# Patient Record
Sex: FEMALE | Race: WHITE | Employment: FULL TIME | URBAN - METROPOLITAN AREA
[De-identification: names, ages, dates, MRNs, and addresses within clinical notes are randomized per-mention and may not be internally consistent; named-entity substitution may affect disease eponyms.]

---

## 2019-08-30 ENCOUNTER — OFFICE VISIT (OUTPATIENT)
Dept: OBGYN | Facility: CLINIC | Age: 24
End: 2019-08-30
Attending: ADVANCED PRACTICE MIDWIFE
Payer: COMMERCIAL

## 2019-08-30 VITALS
BODY MASS INDEX: 22.33 KG/M2 | WEIGHT: 150.8 LBS | HEART RATE: 80 BPM | SYSTOLIC BLOOD PRESSURE: 113 MMHG | DIASTOLIC BLOOD PRESSURE: 73 MMHG | HEIGHT: 69 IN

## 2019-08-30 DIAGNOSIS — Z12.4 SCREENING FOR MALIGNANT NEOPLASM OF CERVIX: ICD-10-CM

## 2019-08-30 DIAGNOSIS — Z00.00 VISIT FOR PREVENTIVE HEALTH EXAMINATION: Primary | ICD-10-CM

## 2019-08-30 DIAGNOSIS — Z30.430 ENCOUNTER FOR IUD INSERTION: ICD-10-CM

## 2019-08-30 DIAGNOSIS — N89.8 VAGINAL ITCHING: ICD-10-CM

## 2019-08-30 DIAGNOSIS — Z01.419 ENCOUNTER FOR GYNECOLOGICAL EXAMINATION WITHOUT ABNORMAL FINDING: ICD-10-CM

## 2019-08-30 LAB
CLUE CELLS: NEGATIVE
TRICHOMONAS (WET PREP): NEGATIVE
YEAST (WET PREP): NEGATIVE

## 2019-08-30 PROCEDURE — 87591 N.GONORRHOEAE DNA AMP PROB: CPT | Performed by: ADVANCED PRACTICE MIDWIFE

## 2019-08-30 PROCEDURE — G0145 SCR C/V CYTO,THINLAYER,RESCR: HCPCS | Performed by: ADVANCED PRACTICE MIDWIFE

## 2019-08-30 PROCEDURE — 87210 SMEAR WET MOUNT SALINE/INK: CPT | Mod: ZF | Performed by: ADVANCED PRACTICE MIDWIFE

## 2019-08-30 PROCEDURE — 87491 CHLMYD TRACH DNA AMP PROBE: CPT | Performed by: ADVANCED PRACTICE MIDWIFE

## 2019-08-30 PROCEDURE — G0463 HOSPITAL OUTPT CLINIC VISIT: HCPCS | Mod: ZF

## 2019-08-30 SDOH — HEALTH STABILITY: MENTAL HEALTH: HOW OFTEN DO YOU HAVE A DRINK CONTAINING ALCOHOL?: NEVER

## 2019-08-30 ASSESSMENT — ENCOUNTER SYMPTOMS
POLYDIPSIA: 0
NUMBNESS: 0
SHORTNESS OF BREATH: 0
SKIN CHANGES: 0
CONSTIPATION: 0
SEIZURES: 0
BRUISES/BLEEDS EASILY: 0
SORE THROAT: 0
EXTREMITY NUMBNESS: 0
POSTURAL DYSPNEA: 0
MEMORY LOSS: 0
PANIC: 0
DECREASED APPETITE: 0
EYE REDNESS: 0
HALLUCINATIONS: 0
COUGH: 0
SLEEP DISTURBANCES DUE TO BREATHING: 0
EYE PAIN: 0
DISTURBANCES IN COORDINATION: 0
CLAUDICATION: 0
RESPIRATORY PAIN: 0
FLANK PAIN: 0
DIARRHEA: 0
ARTHRALGIAS: 0
SINUS PAIN: 0
EYE IRRITATION: 0
HEMATURIA: 0
POOR WOUND HEALING: 0
TREMORS: 0
SINUS CONGESTION: 0
LEG PAIN: 0
ABDOMINAL PAIN: 0
MUSCLE WEAKNESS: 0
INSOMNIA: 0
NAIL CHANGES: 0
PALPITATIONS: 0
TACHYCARDIA: 0
DIZZINESS: 0
HYPOTENSION: 0
NERVOUS/ANXIOUS: 0
ORTHOPNEA: 0
TROUBLE SWALLOWING: 0
WEAKNESS: 0
NIGHT SWEATS: 0
FATIGUE: 0
VOMITING: 0
WEIGHT LOSS: 0
HOT FLASHES: 0
FEVER: 0
BREAST PAIN: 0
HOARSE VOICE: 0
LIGHT-HEADEDNESS: 0
POLYPHAGIA: 0
RECTAL BLEEDING: 0
TASTE DISTURBANCE: 0
NAUSEA: 0
MYALGIAS: 0
DIFFICULTY URINATING: 0
WEIGHT GAIN: 0
SPUTUM PRODUCTION: 0
BOWEL INCONTINENCE: 0
BLOATING: 0
JAUNDICE: 0
LEG SWELLING: 0
HEARTBURN: 0
ALTERED TEMPERATURE REGULATION: 0
DYSURIA: 0
HYPERTENSION: 0
HEMOPTYSIS: 0
EYE WATERING: 0
TINGLING: 0
BACK PAIN: 0
BREAST MASS: 0
LOSS OF CONSCIOUSNESS: 0
CHILLS: 0
JOINT SWELLING: 0
DEPRESSION: 0
HEADACHES: 0
SNORES LOUDLY: 0
NECK PAIN: 0
MUSCLE CRAMPS: 0
INCREASED ENERGY: 0
SPEECH CHANGE: 0
DECREASED LIBIDO: 0
RECTAL PAIN: 0
PARALYSIS: 0
BLOOD IN STOOL: 0
SYNCOPE: 0
SWOLLEN GLANDS: 0
STIFFNESS: 0
SMELL DISTURBANCE: 0
NECK MASS: 0
COUGH DISTURBING SLEEP: 0
EXERCISE INTOLERANCE: 0
DYSPNEA ON EXERTION: 0
WHEEZING: 0
DOUBLE VISION: 0
DECREASED CONCENTRATION: 0

## 2019-08-30 ASSESSMENT — ANXIETY QUESTIONNAIRES
2. NOT BEING ABLE TO STOP OR CONTROL WORRYING: NOT AT ALL
GAD7 TOTAL SCORE: 0
1. FEELING NERVOUS, ANXIOUS, OR ON EDGE: NOT AT ALL
6. BECOMING EASILY ANNOYED OR IRRITABLE: NOT AT ALL
5. BEING SO RESTLESS THAT IT IS HARD TO SIT STILL: NOT AT ALL
7. FEELING AFRAID AS IF SOMETHING AWFUL MIGHT HAPPEN: NOT AT ALL
3. WORRYING TOO MUCH ABOUT DIFFERENT THINGS: NOT AT ALL

## 2019-08-30 ASSESSMENT — PATIENT HEALTH QUESTIONNAIRE - PHQ9: 5. POOR APPETITE OR OVEREATING: NOT AT ALL

## 2019-08-30 ASSESSMENT — MIFFLIN-ST. JEOR: SCORE: 1508.4

## 2019-08-30 NOTE — PROGRESS NOTES
Progress Note    SUBJECTIVE:  Bran Bain is an 24 year old, , who requests an Annual Preventive Exam.     Concerns today include: Complains of very mild itching on her perineum. Denies any abnormal vaginal discharge. No increase in discharge. No odor. No pain with IC. No burning with urination.     Menstrual History:  No flowsheet data found.    First pap today.     Mammogram current: not applicable  Last Mammogram:   No results found.     Last Colonoscopy:  No results found for this or any previous visit.      HISTORY:    Pt eats a vegan diet.   Taking Vit d with K, B12,B complex,  Iron, Lysine      No Known Allergies    There is no immunization history on file for this patient.    OB History    Para Term  AB Living   0 0 0 0 0 0   SAB TAB Ectopic Multiple Live Births   0 0 0 0 0     Past Medical History:   Diagnosis Date     H/O cold sores     oral only     Past Surgical History:   Procedure Laterality Date     NO HISTORY OF SURGERY       tooth extraction       History reviewed. No pertinent family history.  Social History     Socioeconomic History     Marital status: Single     Spouse name: Not on file     Number of children: Not on file     Years of education: Not on file     Highest education level: Not on file   Occupational History     Not on file   Social Needs     Financial resource strain: Not on file     Food insecurity:     Worry: Not on file     Inability: Not on file     Transportation needs:     Medical: Not on file     Non-medical: Not on file   Tobacco Use     Smoking status: Not on file   Substance and Sexual Activity     Alcohol use: Not on file     Drug use: Not on file     Sexual activity: Not on file   Lifestyle     Physical activity:     Days per week: Not on file     Minutes per session: Not on file     Stress: Not on file   Relationships     Social connections:     Talks on phone: Not on file     Gets together: Not on file     Attends Christian service: Not  on file     Active member of club or organization: Not on file     Attends meetings of clubs or organizations: Not on file     Relationship status: Not on file     Intimate partner violence:     Fear of current or ex partner: Not on file     Emotionally abused: Not on file     Physically abused: Not on file     Forced sexual activity: Not on file   Other Topics Concern     Not on file   Social History Narrative     Not on file       Review of Systems     Constitutional:  Negative for fever, chills, weight loss, weight gain, fatigue, decreased appetite, night sweats, recent stressors, height gain, height loss, post-operative complications, incisional pain, hallucinations, increased energy, hyperactivity and confused.   HENT:  Negative for ear pain, hearing loss, tinnitus, nosebleeds, trouble swallowing, hoarse voice, mouth sores, sore throat, ear discharge, tooth pain, gum tenderness, taste disturbance, smell disturbance, hearing aid, bleeding gums, dry mouth, sinus pain, sinus congestion and neck mass.    Eyes:  Negative for double vision, pain, redness, eye pain, decreased vision, eye watering, eye bulging, eye dryness, flashing lights, spots, floaters, strabismus, tunnel vision, jaundice and eye irritation.   Respiratory:   Negative for cough, hemoptysis, sputum production, shortness of breath, wheezing, sleep disturbances due to breathing, snores loudly, respiratory pain, dyspnea on exertion, cough disturbing sleep and postural dyspnea.    Cardiovascular:  Negative for chest pain, dyspnea on exertion, palpitations, orthopnea, claudication, leg swelling, fingers/toes turn blue, hypertension, hypotension, syncope, history of heart murmur, chest pain on exertion, chest pain at rest, pacemaker, few scattered varicosities, leg pain, sleep disturbances due to breathing, tachycardia, light-headedness, exercise intolerance and edema.   Gastrointestinal:  Negative for heartburn, nausea, vomiting, abdominal pain, diarrhea,  "constipation, blood in stool, melena, rectal pain, bloating, hemorrhoids, bowel incontinence, jaundice, rectal bleeding, coffee ground emesis and change in stool.   Genitourinary:  Negative for bladder incontinence, dysuria, urgency, hematuria, flank pain, vaginal discharge, difficulty urinating, genital sores, dyspareunia, decreased libido, nocturia, voiding less frequently, arousal difficulty, abnormal vaginal bleeding, excessive menstruation, menstrual changes, hot flashes, vaginal dryness and postmenopausal bleeding.   Musculoskeletal:  Negative for myalgias, back pain, joint swelling, arthralgias, stiffness, muscle cramps, neck pain, bone pain, muscle weakness and fracture.   Skin:  Negative for nail changes, itching, poor wound healing, rash, hair changes, skin changes, acne, warts, poor wound healing, scarring, flaky skin, Raynaud's phenomenon, sensitivity to sunlight and skin thickening.   Neurological:  Negative for dizziness, tingling, tremors, speech change, seizures, loss of consciousness, weakness, light-headedness, numbness, headaches, disturbances in coordination, extremity numbness, memory loss, difficulty walking and paralysis.   Endo/Heme:  Negative for anemia, swollen glands and bruises/bleeds easily.   Psychiatric/Behavioral:  Negative for depression, hallucinations, memory loss, decreased concentration, mood swings and panic attacks.    Breast:  Negative for breast discharge, breast mass, breast pain and nipple retraction.   Endocrine:  Negative for altered temperature regulation, polyphagia, polydipsia, unwanted hair growth and change in facial hair.    [unfilled]  No flowsheet data found.  GLENDY-7 SCORE 8/30/2019   Total Score 0         EXAM:  Blood pressure 113/73, pulse 80, height 1.753 m (5' 9\"), weight 68.4 kg (150 lb 12.8 oz), not currently breastfeeding. Body mass index is 22.27 kg/m .  General - pleasant female in no acute distress.  Skin - no suspicious lesions or rashes  ROSARIO PRETTY, " euthyroid with out palpable nodules  Neck - supple without lymphadenopathy.  Lungs - clear to auscultation bilaterally.  Heart - regular rate and rhythm without murmur.  Abdomen - soft, nontender, nondistended, no masses or organomegaly noted.  Musculoskeletal - no gross deformities.  Neurological - normal strength, sensation, and mental status.    Breast Exam:  Breast: Without visible skin changes. No dimpling or lesions seen.   Breasts supple, non-tender with palpation, no dominant mass, nodularity, or nipple discharge noted bilaterally. Axillary nodes negative.      Pelvic Exam:  EG/BUS: Normal genital architecture without lesions, erythema or abnormal secretions Bartholin's, Urethra, [place]'s normal   Urethral meatus: normal   Urethra: no masses, tenderness, or scarring   Bladder: no masses or tenderness   Vagina: moist, pink, rugae with creamy, white and odorless  secretions  Cervix: Nulliparous, and pink, moist, closed, without lesion or CMT, IUD strings visible  Uterus: small, smooth, firm, mobile w/o pain  Adnexa: Within normal limits and No masses, nodularity, tenderness  Rectum:anus normal     Microscopic wet-mount exam shows No yeast, No clue cells and No trichomonads. Normal pH. Negative whiff.       ASSESSMENT:  Encounter Diagnoses   Name Primary?     Visit for preventive health examination Yes     Screening for malignant neoplasm of cervix      Encounter for gynecological examination without abnormal finding      Encounter for IUD insertion         PLAN:   Orders Placed This Encounter   Procedures     Pelvic and Breast Exam Procedure []     Pap Smear Exam [] Do Not Remove     Pap imaged thin layer screen only - recommended age 21 - 24 years     25- [place]-Vitamin D     Vitamin B12     Additional teaching done at this visit regarding exercise and birth control.  Vaginal hygiene reviewed.    Return to clinic in one year.  Follow-up as needed.

## 2019-08-30 NOTE — PATIENT INSTRUCTIONS

## 2019-08-30 NOTE — LETTER
2019       RE: Bran Bain  [address]  [facility] [address]     Dear Colleague,    Thank you for referring your patient, Bran Bain, to the [facility] at [facility]. Please see a copy of my visit note below.        Progress Note    SUBJECTIVE:  Bran Bain is an 24 year old, , who requests an Annual Preventive Exam.     Concerns today include: Complains of very mild itching on her perineum. Denies any abnormal vaginal discharge. No increase in discharge. No odor. No pain with IC. No burning with urination.     Menstrual History:  No flowsheet data found.    First pap today.     Mammogram current: not applicable  Last Mammogram:   No results found.     Last Colonoscopy:  No results found for this or any previous visit.    HISTORY:  Pt eats a vegan diet.   Taking Vit d with K, B12,B complex,  Iron, Lysine      No Known Allergies    There is no immunization history on file for this patient.    OB History    Para Term  AB Living   0 0 0 0 0 0   SAB TAB Ectopic Multiple Live Births   0 0 0 0 0     Past Medical History:   Diagnosis Date     H/O cold sores     oral only     Past Surgical History:   Procedure Laterality Date     NO HISTORY OF SURGERY       tooth extraction  2017     History reviewed. No pertinent family history.  Social History     Socioeconomic History     Marital status: Single     Spouse name: Not on file     Number of children: Not on file     Years of education: Not on file     Highest education level: Not on file   Occupational History     Not on file   Social Needs     Financial resource strain: Not on file     Food insecurity:     Worry: Not on file     Inability: Not on file     Transportation needs:     Medical: Not on file     Non-medical: Not on file   Tobacco Use     Smoking status: Not on file   Substance and Sexual Activity     Alcohol use: Not on file     Drug use: Not on  file     Sexual activity: Not on file   Lifestyle     Physical activity:     Days per week: Not on file     Minutes per session: Not on file     Stress: Not on file   Relationships     Social connections:     Talks on phone: Not on file     Gets together: Not on file     Attends [employer] service: Not on file     Active member of club or organization: Not on file     Attends meetings of clubs or organizations: Not on file     Relationship status: Not on file     Intimate partner violence:     Fear of current or ex partner: Not on file     Emotionally abused: Not on file     Physically abused: Not on file     Forced sexual activity: Not on file   Other Topics Concern     Not on file   Social History Narrative     Not on file     Review of Systems     Constitutional:  Negative for fever, chills, weight loss, weight gain, fatigue, decreased appetite, night sweats, recent stressors, height gain, height loss, post-operative complications, incisional pain, hallucinations, increased energy, hyperactivity and confused.   HENT:  Negative for ear pain, hearing loss, tinnitus, nosebleeds, trouble swallowing, hoarse voice, mouth sores, sore throat, ear discharge, tooth pain, gum tenderness, taste disturbance, smell disturbance, hearing aid, bleeding gums, dry mouth, sinus pain, sinus congestion and neck mass.    Eyes:  Negative for double vision, pain, redness, eye pain, decreased vision, eye watering, eye bulging, eye dryness, flashing lights, spots, floaters, strabismus, tunnel vision, jaundice and eye irritation.   Respiratory:   Negative for cough, hemoptysis, sputum production, shortness of breath, wheezing, sleep disturbances due to breathing, snores loudly, respiratory pain, dyspnea on exertion, cough disturbing sleep and postural dyspnea.    Cardiovascular:  Negative for chest pain, dyspnea on exertion, palpitations, orthopnea, claudication, leg swelling, fingers/toes turn blue, hypertension, hypotension, syncope,  history of heart murmur, chest pain on exertion, chest pain at rest, pacemaker, few scattered varicosities, leg pain, sleep disturbances due to breathing, tachycardia, light-headedness, exercise intolerance and edema.   Gastrointestinal:  Negative for heartburn, nausea, vomiting, abdominal pain, diarrhea, constipation, blood in stool, melena, rectal pain, bloating, hemorrhoids, bowel incontinence, jaundice, rectal bleeding, coffee ground emesis and change in stool.   Genitourinary:  Negative for bladder incontinence, dysuria, urgency, hematuria, flank pain, vaginal discharge, difficulty urinating, genital sores, dyspareunia, decreased libido, nocturia, voiding less frequently, arousal difficulty, abnormal vaginal bleeding, excessive menstruation, menstrual changes, hot flashes, vaginal dryness and postmenopausal bleeding.   Musculoskeletal:  Negative for myalgias, back pain, joint swelling, arthralgias, stiffness, muscle cramps, neck pain, bone pain, muscle weakness and fracture.   Skin:  Negative for nail changes, itching, poor wound healing, rash, hair changes, skin changes, acne, warts, poor wound healing, scarring, flaky skin, Raynaud's phenomenon, sensitivity to sunlight and skin thickening.   Neurological:  Negative for dizziness, tingling, tremors, speech change, seizures, loss of consciousness, weakness, light-headedness, numbness, headaches, disturbances in coordination, extremity numbness, memory loss, difficulty walking and paralysis.   Endo/Heme:  Negative for anemia, swollen glands and bruises/bleeds easily.   Psychiatric/Behavioral:  Negative for depression, hallucinations, memory loss, decreased concentration, mood swings and panic attacks.    Breast:  Negative for breast discharge, breast mass, breast pain and nipple retraction.   Endocrine:  Negative for altered temperature regulation, polyphagia, polydipsia, unwanted hair growth and change in facial hair.    [unfilled]  No flowsheet data found.  GLENDY-7  "SCORE 8/30/2019   Total Score 0     EXAM:  Blood pressure 113/73, pulse 80, height 1.753 m (5' 9\"), weight 68.4 kg (150 lb 12.8 oz), not currently breastfeeding. Body mass index is 22.27 kg/m .  General - pleasant female in no acute distress.  Skin - no suspicious lesions or rashes  EENT-  PERRLA, euthyroid with out palpable nodules  Neck - supple without lymphadenopathy.  Lungs - clear to auscultation bilaterally.  Heart - regular rate and rhythm without murmur.  Abdomen - soft, nontender, nondistended, no masses or organomegaly noted.  Musculoskeletal - no gross deformities.  Neurological - normal strength, sensation, and mental status.    Breast Exam:  Breast: Without visible skin changes. No dimpling or lesions seen.   Breasts supple, non-tender with palpation, no dominant mass, nodularity, or nipple discharge noted bilaterally. Axillary nodes negative.      Pelvic Exam:  EG/BUS: Normal genital architecture without lesions, erythema or abnormal secretions Bartholin's, Urethra, [place]'s normal   Urethral meatus: normal   Urethra: no masses, tenderness, or scarring   Bladder: no masses or tenderness   Vagina: moist, pink, rugae with creamy, white and odorless  secretions  Cervix: Nulliparous, and pink, moist, closed, without lesion or CMT, IUD strings visible  Uterus: small, smooth, firm, mobile w/o pain  Adnexa: Within normal limits and No masses, nodularity, tenderness  Rectum:anus normal     Microscopic wet-mount exam shows No yeast, No clue cells and No trichomonads. Normal pH. Negative whiff.     ASSESSMENT:  Encounter Diagnoses   Name Primary?     Visit for preventive health examination Yes     Screening for malignant neoplasm of cervix      Encounter for gynecological examination without abnormal finding      Encounter for IUD insertion      PLAN:   Orders Placed This Encounter   Procedures     Pelvic and Breast Exam Procedure []     Pap Smear Exam [] Do Not Remove     Pap imaged thin layer screen " only - recommended age 21 - 24 years     25- [place]-Vitamin D     Vitamin B12     Additional teaching done at this visit regarding exercise and birth control.  Vaginal hygiene reviewed.    Return to clinic in one year.  Follow-up as needed.    Again, thank you for allowing me to participate in the care of your patient.      Sincerely,    AIME Reynaga CNM

## 2019-08-31 ASSESSMENT — ANXIETY QUESTIONNAIRES: GAD7 TOTAL SCORE: 0

## 2019-09-01 LAB
C TRACH DNA SPEC QL NAA+PROBE: NEGATIVE
N GONORRHOEA DNA SPEC QL NAA+PROBE: NEGATIVE
SPECIMEN SOURCE: NORMAL
SPECIMEN SOURCE: NORMAL

## 2019-09-03 ENCOUNTER — TELEPHONE (OUTPATIENT)
Dept: OBGYN | Facility: CLINIC | Age: 24
End: 2019-09-03

## 2019-09-03 DIAGNOSIS — Z00.00 VISIT FOR PREVENTIVE HEALTH EXAMINATION: Primary | ICD-10-CM

## 2019-09-03 NOTE — TELEPHONE ENCOUNTER
Bran seen by EZE 8/30/19 and labs ordered and Bran didn't get them that day so lab asking that they get changed to future orders.   Labs re-ordered and reminded  Bran via vm to go to out pt lab at her convenience.

## 2019-09-04 LAB
COPATH REPORT: NORMAL
PAP: NORMAL

## 2020-03-11 ENCOUNTER — HEALTH MAINTENANCE LETTER (OUTPATIENT)
Age: 25
End: 2020-03-11

## 2021-01-03 ENCOUNTER — HEALTH MAINTENANCE LETTER (OUTPATIENT)
Age: 26
End: 2021-01-03

## 2021-10-10 ENCOUNTER — HEALTH MAINTENANCE LETTER (OUTPATIENT)
Age: 26
End: 2021-10-10

## 2022-01-30 ENCOUNTER — HEALTH MAINTENANCE LETTER (OUTPATIENT)
Age: 27
End: 2022-01-30

## 2022-09-18 ENCOUNTER — HEALTH MAINTENANCE LETTER (OUTPATIENT)
Age: 27
End: 2022-09-18

## 2023-05-07 ENCOUNTER — HEALTH MAINTENANCE LETTER (OUTPATIENT)
Age: 28
End: 2023-05-07